# Patient Record
Sex: MALE | Race: WHITE | NOT HISPANIC OR LATINO | Employment: OTHER | URBAN - METROPOLITAN AREA
[De-identification: names, ages, dates, MRNs, and addresses within clinical notes are randomized per-mention and may not be internally consistent; named-entity substitution may affect disease eponyms.]

---

## 2024-03-07 ENCOUNTER — TELEPHONE (OUTPATIENT)
Dept: RADIATION ONCOLOGY | Facility: HOSPITAL | Age: 89
End: 2024-03-07
Payer: MEDICARE

## 2024-03-08 ENCOUNTER — CONSULT (OUTPATIENT)
Dept: RADIATION ONCOLOGY | Facility: HOSPITAL | Age: 89
End: 2024-03-08
Payer: MEDICARE

## 2024-03-08 VITALS
SYSTOLIC BLOOD PRESSURE: 128 MMHG | HEART RATE: 58 BPM | DIASTOLIC BLOOD PRESSURE: 65 MMHG | WEIGHT: 163.4 LBS | OXYGEN SATURATION: 97 %

## 2024-03-08 DIAGNOSIS — C44.92 SQUAMOUS CELL SKIN CANCER: Primary | ICD-10-CM

## 2024-03-08 DIAGNOSIS — C44.702: ICD-10-CM

## 2024-03-08 PROCEDURE — 77263 THER RADIOLOGY TX PLNG CPLX: CPT | Performed by: RADIOLOGY

## 2024-03-08 PROCEDURE — G0463 HOSPITAL OUTPT CLINIC VISIT: HCPCS

## 2024-03-08 RX ORDER — ATORVASTATIN CALCIUM 10 MG/1
20 TABLET, FILM COATED ORAL DAILY
COMMUNITY

## 2024-03-08 NOTE — PROGRESS NOTES
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    HISTORY OF PRESENT ILLNESS:  Matheus Israel is a 89 y.o. male who presents with a persistent squamous cell carcinoma of the right pretibial skin.  He is enjoyed relatively fairly good health with the exception of a cardiac bypass performed for coronary artery disease.  Patient has no soft tissue disorders.  He is also quite hard of hearing.  He does have some mild mental confusion and possible early onset dementia.    Patient has been seen by Dr. Jamar Richardson for a lesion of the right lower leg.  This area was initially biopsied 10/4/2023 and returned as a crateriform squamous cell carcinoma well-differentiated with clean margins.  Unfortunately, this area recurred quickly and shave biopsy performed 11/20/2023 revealed recurrent squamous cell carcinoma.  This area has been followed and unfortunately has developed a firm elevated mass at the previous site of excision.  This now measures some 15 x 12 mm.  Patient is referred at this time for consideration of definitive radiotherapy.        The following portions of the patient's history were reviewed and updated as appropriate: allergies, current medications, past family history, past medical history, past social history, past surgical history and problem list.  Reviewed with the patient and remain unchanged.    PAST MEDICAL HISTORY:  he  has no past medical history on file.  MEDICATIONS:   Current Outpatient Medications:     atorvastatin (LIPITOR) 10 MG tablet, Take 2 tablets by mouth Daily., Disp: , Rfl:   ALLERGIES: No Known Allergies  PAST SURGICAL HISTORY: he has no past surgical history on file.  PREVIOUS RADIOTHERAPY OR CHEMOTHERAPY: No  FAMILY HISTORY: his family history is not on file.  SOCIAL HISTORY: he    PAIN AND PAIN MANAGEMENT:   Vitals:    03/08/24 1316   BP: 128/65   Pulse: 58   SpO2: 97%   Weight: 74.1 kg (163 lb 6.4 oz)   PainSc: 0-No pain     NUTRITIONAL STATUS:  Otherwise no issues  KPS: 80:  Normal activity with effort; some signs or symptoms  ECOG: (2) Ambulatory and capable of self care, unable to carry out work activity, up and about > 50% or waking hours       PHQ-9 Total Score: 1     Review of Systems:   Review of Systems       Objective     Vitals:  Vitals:    03/08/24 1316   BP: 128/65   Pulse: 58   SpO2: 97%   Weight: 74.1 kg (163 lb 6.4 oz)   PainSc: 0-No pain         PHYSICAL EXAM:  Physical Exam     PERTINENT IMAGING/PATHOLOGY/LABS (Medical Decision Making):     COORDINATION OF CARE: A copy of this note is sent to the referring provider.    PATHOLOGY (Reviewed):     IMAGING (Reviewed):     LABS (Reviewed):  Hematology WBC   Date Value Ref Range Status   07/23/2018 7.8 3.50 - 10.00 10*3/uL Final     RBC   Date Value Ref Range Status   07/23/2018 4.84 3.5 - 5.5 10*6/uL Final     Hemoglobin   Date Value Ref Range Status   07/23/2018 14.7 13.5 - 17.5 g/dL Final     Hematocrit   Date Value Ref Range Status   07/23/2018 43.9 33.0 - 51.0 % Final     Platelets   Date Value Ref Range Status   07/23/2018 176 100 - 400 10*3/uL Final      Chemistry   Lab Results   Component Value Date    CREATININE 1.4 (H) 04/19/2018    ALBUMIN 4.5 01/31/2019    LABIL2 1.2 01/31/2019    AST 38 01/31/2019    ALT 26 01/31/2019       Assessment & Plan     ASSESSMENT  :  Diagnoses and all orders for this visit:    1. Squamous cell skin cancer (Primary)    2. Primary cancer of skin of right lower leg         PLAN: Mr. Israel appears to be a good candidate for definitive radiotherapy to the right lower leg.  Plans would be to administer a total of 51 Gray over 17 fractions with treatment delivered 4 times per week.  This would be done with low energy electrons with bolus.  Various side effects complications associated with local radiotherapy were discussed in detail with Mr. Israel as well as his son who is a physician in attendance.    I will plan to see him Monday, 11 March 2024 for initial clinical simulation.    I spent 30 minutes caring for Matheus on this date of service. This time includes time spent by me in the following activities:preparing for the visit, reviewing tests, obtaining and/or reviewing a separately obtained history, performing a medically appropriate examination and/or evaluation , ordering medications, tests, or procedures, referring and communicating with other health care professionals , and independently interpreting results and communicating that information with the patient/family/caregiver       CC: Vineet Ni,* Nikki Salazar MD Mark R. Jones, MD  3/8/2024  1:48 PM EST

## 2024-03-11 ENCOUNTER — HOSPITAL ENCOUNTER (OUTPATIENT)
Dept: RADIATION ONCOLOGY | Facility: HOSPITAL | Age: 89
Setting detail: RADIATION/ONCOLOGY SERIES
End: 2024-03-11
Payer: MEDICARE

## 2024-03-11 PROCEDURE — 77290 THER RAD SIMULAJ FIELD CPLX: CPT | Performed by: RADIOLOGY

## 2024-03-11 PROCEDURE — 77334 RADIATION TREATMENT AID(S): CPT | Performed by: RADIOLOGY

## 2024-03-14 PROCEDURE — 77334 RADIATION TREATMENT AID(S): CPT | Performed by: RADIOLOGY

## 2024-03-14 PROCEDURE — 77300 RADIATION THERAPY DOSE PLAN: CPT | Performed by: RADIOLOGY

## 2024-03-14 PROCEDURE — 77295 3-D RADIOTHERAPY PLAN: CPT | Performed by: RADIOLOGY

## 2024-03-19 ENCOUNTER — HOSPITAL ENCOUNTER (OUTPATIENT)
Dept: RADIATION ONCOLOGY | Facility: HOSPITAL | Age: 89
Discharge: HOME | End: 2024-03-19

## 2024-03-19 LAB
RAD ONC ARIA COURSE ID: NORMAL
RAD ONC ARIA COURSE INTENT: NORMAL
RAD ONC ARIA COURSE LAST TREATMENT DATE: NORMAL
RAD ONC ARIA COURSE START DATE: NORMAL
RAD ONC ARIA COURSE TREATMENT ELAPSED DAYS: 0
RAD ONC ARIA FIRST TREATMENT DATE: NORMAL
RAD ONC ARIA PLAN FRACTIONS TREATED TO DATE: 1
RAD ONC ARIA PLAN ID: NORMAL
RAD ONC ARIA PLAN PRESCRIBED DOSE PER FRACTION: 3 GY
RAD ONC ARIA PLAN PRIMARY REFERENCE POINT: NORMAL
RAD ONC ARIA PLAN TOTAL FRACTIONS PRESCRIBED: 17
RAD ONC ARIA PLAN TOTAL PRESCRIBED DOSE: 5100 CGY
RAD ONC ARIA REFERENCE POINT DOSAGE GIVEN TO DATE: 3 GY
RAD ONC ARIA REFERENCE POINT ID: NORMAL
RAD ONC ARIA REFERENCE POINT SESSION DOSAGE GIVEN: 3 GY

## 2024-03-19 PROCEDURE — 77332 RADIATION TREATMENT AID(S): CPT | Performed by: RADIOLOGY

## 2024-03-19 PROCEDURE — 77412 RADIATION TX DELIVERY LVL 3: CPT | Performed by: RADIOLOGY

## 2024-03-19 PROCEDURE — 77280 THER RAD SIMULAJ FIELD SMPL: CPT | Performed by: RADIOLOGY

## 2024-03-19 PROCEDURE — 77427 RADIATION TX MANAGEMENT X5: CPT | Performed by: RADIOLOGY

## 2024-03-20 ENCOUNTER — HOSPITAL ENCOUNTER (OUTPATIENT)
Dept: RADIATION ONCOLOGY | Facility: HOSPITAL | Age: 89
Discharge: HOME | End: 2024-03-20

## 2024-03-20 LAB
RAD ONC ARIA COURSE ID: NORMAL
RAD ONC ARIA COURSE INTENT: NORMAL
RAD ONC ARIA COURSE LAST TREATMENT DATE: NORMAL
RAD ONC ARIA COURSE START DATE: NORMAL
RAD ONC ARIA COURSE TREATMENT ELAPSED DAYS: 1
RAD ONC ARIA FIRST TREATMENT DATE: NORMAL
RAD ONC ARIA PLAN FRACTIONS TREATED TO DATE: 2
RAD ONC ARIA PLAN ID: NORMAL
RAD ONC ARIA PLAN PRESCRIBED DOSE PER FRACTION: 3 GY
RAD ONC ARIA PLAN PRIMARY REFERENCE POINT: NORMAL
RAD ONC ARIA PLAN TOTAL FRACTIONS PRESCRIBED: 17
RAD ONC ARIA PLAN TOTAL PRESCRIBED DOSE: 5100 CGY
RAD ONC ARIA REFERENCE POINT DOSAGE GIVEN TO DATE: 6 GY
RAD ONC ARIA REFERENCE POINT ID: NORMAL
RAD ONC ARIA REFERENCE POINT SESSION DOSAGE GIVEN: 3 GY

## 2024-03-20 PROCEDURE — 77412 RADIATION TX DELIVERY LVL 3: CPT | Performed by: RADIOLOGY

## 2024-03-21 ENCOUNTER — HOSPITAL ENCOUNTER (OUTPATIENT)
Dept: RADIATION ONCOLOGY | Facility: HOSPITAL | Age: 89
Discharge: HOME | End: 2024-03-21

## 2024-03-21 LAB
RAD ONC ARIA COURSE ID: NORMAL
RAD ONC ARIA COURSE INTENT: NORMAL
RAD ONC ARIA COURSE LAST TREATMENT DATE: NORMAL
RAD ONC ARIA COURSE START DATE: NORMAL
RAD ONC ARIA COURSE TREATMENT ELAPSED DAYS: 2
RAD ONC ARIA FIRST TREATMENT DATE: NORMAL
RAD ONC ARIA PLAN FRACTIONS TREATED TO DATE: 3
RAD ONC ARIA PLAN ID: NORMAL
RAD ONC ARIA PLAN PRESCRIBED DOSE PER FRACTION: 3 GY
RAD ONC ARIA PLAN PRIMARY REFERENCE POINT: NORMAL
RAD ONC ARIA PLAN TOTAL FRACTIONS PRESCRIBED: 17
RAD ONC ARIA PLAN TOTAL PRESCRIBED DOSE: 5100 CGY
RAD ONC ARIA REFERENCE POINT DOSAGE GIVEN TO DATE: 9 GY
RAD ONC ARIA REFERENCE POINT ID: NORMAL
RAD ONC ARIA REFERENCE POINT SESSION DOSAGE GIVEN: 3 GY

## 2024-03-21 PROCEDURE — 77412 RADIATION TX DELIVERY LVL 3: CPT | Performed by: RADIOLOGY

## 2024-03-21 PROCEDURE — 77336 RADIATION PHYSICS CONSULT: CPT | Performed by: RADIOLOGY

## 2024-03-22 ENCOUNTER — HOSPITAL ENCOUNTER (OUTPATIENT)
Dept: RADIATION ONCOLOGY | Facility: HOSPITAL | Age: 89
Discharge: HOME | End: 2024-03-22

## 2024-03-22 LAB
RAD ONC ARIA COURSE ID: NORMAL
RAD ONC ARIA COURSE INTENT: NORMAL
RAD ONC ARIA COURSE LAST TREATMENT DATE: NORMAL
RAD ONC ARIA COURSE START DATE: NORMAL
RAD ONC ARIA COURSE TREATMENT ELAPSED DAYS: 3
RAD ONC ARIA FIRST TREATMENT DATE: NORMAL
RAD ONC ARIA PLAN FRACTIONS TREATED TO DATE: 4
RAD ONC ARIA PLAN ID: NORMAL
RAD ONC ARIA PLAN PRESCRIBED DOSE PER FRACTION: 3 GY
RAD ONC ARIA PLAN PRIMARY REFERENCE POINT: NORMAL
RAD ONC ARIA PLAN TOTAL FRACTIONS PRESCRIBED: 17
RAD ONC ARIA PLAN TOTAL PRESCRIBED DOSE: 5100 CGY
RAD ONC ARIA REFERENCE POINT DOSAGE GIVEN TO DATE: 12 GY
RAD ONC ARIA REFERENCE POINT ID: NORMAL
RAD ONC ARIA REFERENCE POINT SESSION DOSAGE GIVEN: 3 GY

## 2024-03-22 PROCEDURE — 77412 RADIATION TX DELIVERY LVL 3: CPT | Performed by: RADIOLOGY

## 2024-03-25 ENCOUNTER — HOSPITAL ENCOUNTER (OUTPATIENT)
Dept: RADIATION ONCOLOGY | Facility: HOSPITAL | Age: 89
Discharge: HOME | End: 2024-03-25
Payer: MEDICARE

## 2024-03-25 ENCOUNTER — RADIATION ONCOLOGY WEEKLY ASSESSMENT (OUTPATIENT)
Dept: RADIATION ONCOLOGY | Facility: HOSPITAL | Age: 89
End: 2024-03-25
Payer: MEDICARE

## 2024-03-25 VITALS — DIASTOLIC BLOOD PRESSURE: 80 MMHG | SYSTOLIC BLOOD PRESSURE: 146 MMHG | OXYGEN SATURATION: 96 % | HEART RATE: 57 BPM

## 2024-03-25 DIAGNOSIS — C44.702: ICD-10-CM

## 2024-03-25 DIAGNOSIS — C44.92 SQUAMOUS CELL SKIN CANCER: Primary | ICD-10-CM

## 2024-03-25 LAB
RAD ONC ARIA COURSE ID: NORMAL
RAD ONC ARIA COURSE INTENT: NORMAL
RAD ONC ARIA COURSE LAST TREATMENT DATE: NORMAL
RAD ONC ARIA COURSE START DATE: NORMAL
RAD ONC ARIA COURSE TREATMENT ELAPSED DAYS: 6
RAD ONC ARIA FIRST TREATMENT DATE: NORMAL
RAD ONC ARIA PLAN FRACTIONS TREATED TO DATE: 5
RAD ONC ARIA PLAN ID: NORMAL
RAD ONC ARIA PLAN PRESCRIBED DOSE PER FRACTION: 3 GY
RAD ONC ARIA PLAN PRIMARY REFERENCE POINT: NORMAL
RAD ONC ARIA PLAN TOTAL FRACTIONS PRESCRIBED: 17
RAD ONC ARIA PLAN TOTAL PRESCRIBED DOSE: 5100 CGY
RAD ONC ARIA REFERENCE POINT DOSAGE GIVEN TO DATE: 15 GY
RAD ONC ARIA REFERENCE POINT ID: NORMAL
RAD ONC ARIA REFERENCE POINT SESSION DOSAGE GIVEN: 3 GY

## 2024-03-25 PROCEDURE — 77412 RADIATION TX DELIVERY LVL 3: CPT | Performed by: RADIOLOGY

## 2024-03-25 NOTE — PROGRESS NOTES
Radiation Oncology  On-Treatment Note      Patient: Matheus Israel    MRN: [id number]    Attending Physician: Odell Szymanski MD     Diagnosis:     ICD-10-CM ICD-9-CM   1. Squamous cell skin cancer  C44.92 173.92   2. Primary cancer of skin of right lower leg  C44.702 173.70       Radiation Therapy Visit:  Continue radiation therapy, Dosimetry plan remains acceptable, Films reviewed and remains acceptable, Pain assessed, Pain management planned, Radiation dose schedule reviewed and remains acceptable, Radiation technique remains acceptable, and Symptoms within expected range    Radiation Treatments       Active   Plans   Rt Lower Leg   Most recent treatment: Dose planned: 300 cGy (fraction 5 on 3/25/2024)   Total: Dose planned: 5,100 cGy (17 fractions)   Elapsed Days: 6      Reference Points   RX: RtLowerLeg   Most recent treatment: Dose given: 300 cGy (on 3/25/2024)   Total: Dose given: 1,500 cGy   Elapsed Days: 6                      Physical Examination:  Vitals: Blood pressure 146/80, pulse 57, SpO2 96%.  Pain Score    03/25/24 1220   PainSc: 0-No pain       Restricted in physically strenuous activity but ambulatory and able to carry out work of a light or sedentary nature, e.g., light house work, office work = 1    We examined the relevant areas: yes, minimal reaction so far. No breakdown seen.  Findings are within the expected range for this stage of treatment: yes  -------------------------------------------------------------------------------------------------------------------    ACTION ITEMS:  Patient tolerating treatment well and as expected for this stage in their treatment and Continue radiation therapy as planned    Estimated Completion Date: 3 weeks      Odell Szymanski MD  Radiation Oncology

## 2024-03-26 ENCOUNTER — HOSPITAL ENCOUNTER (OUTPATIENT)
Dept: RADIATION ONCOLOGY | Facility: HOSPITAL | Age: 89
Discharge: HOME | End: 2024-03-26

## 2024-03-26 LAB
RAD ONC ARIA COURSE ID: NORMAL
RAD ONC ARIA COURSE INTENT: NORMAL
RAD ONC ARIA COURSE LAST TREATMENT DATE: NORMAL
RAD ONC ARIA COURSE START DATE: NORMAL
RAD ONC ARIA COURSE TREATMENT ELAPSED DAYS: 7
RAD ONC ARIA FIRST TREATMENT DATE: NORMAL
RAD ONC ARIA PLAN FRACTIONS TREATED TO DATE: 6
RAD ONC ARIA PLAN ID: NORMAL
RAD ONC ARIA PLAN PRESCRIBED DOSE PER FRACTION: 3 GY
RAD ONC ARIA PLAN PRIMARY REFERENCE POINT: NORMAL
RAD ONC ARIA PLAN TOTAL FRACTIONS PRESCRIBED: 17
RAD ONC ARIA PLAN TOTAL PRESCRIBED DOSE: 5100 CGY
RAD ONC ARIA REFERENCE POINT DOSAGE GIVEN TO DATE: 18 GY
RAD ONC ARIA REFERENCE POINT ID: NORMAL
RAD ONC ARIA REFERENCE POINT SESSION DOSAGE GIVEN: 3 GY

## 2024-03-26 PROCEDURE — 77427 RADIATION TX MANAGEMENT X5: CPT | Performed by: RADIOLOGY

## 2024-03-26 PROCEDURE — 77412 RADIATION TX DELIVERY LVL 3: CPT | Performed by: RADIOLOGY

## 2024-03-27 ENCOUNTER — HOSPITAL ENCOUNTER (OUTPATIENT)
Dept: RADIATION ONCOLOGY | Facility: HOSPITAL | Age: 89
Discharge: HOME | End: 2024-03-27

## 2024-03-27 LAB
RAD ONC ARIA COURSE ID: NORMAL
RAD ONC ARIA COURSE INTENT: NORMAL
RAD ONC ARIA COURSE LAST TREATMENT DATE: NORMAL
RAD ONC ARIA COURSE START DATE: NORMAL
RAD ONC ARIA COURSE TREATMENT ELAPSED DAYS: 8
RAD ONC ARIA FIRST TREATMENT DATE: NORMAL
RAD ONC ARIA PLAN FRACTIONS TREATED TO DATE: 7
RAD ONC ARIA PLAN ID: NORMAL
RAD ONC ARIA PLAN PRESCRIBED DOSE PER FRACTION: 3 GY
RAD ONC ARIA PLAN PRIMARY REFERENCE POINT: NORMAL
RAD ONC ARIA PLAN TOTAL FRACTIONS PRESCRIBED: 17
RAD ONC ARIA PLAN TOTAL PRESCRIBED DOSE: 5100 CGY
RAD ONC ARIA REFERENCE POINT DOSAGE GIVEN TO DATE: 21 GY
RAD ONC ARIA REFERENCE POINT ID: NORMAL
RAD ONC ARIA REFERENCE POINT SESSION DOSAGE GIVEN: 3 GY

## 2024-03-27 PROCEDURE — 77412 RADIATION TX DELIVERY LVL 3: CPT | Performed by: RADIOLOGY

## 2024-03-28 ENCOUNTER — HOSPITAL ENCOUNTER (OUTPATIENT)
Dept: RADIATION ONCOLOGY | Facility: HOSPITAL | Age: 89
Discharge: HOME | End: 2024-03-28

## 2024-03-28 LAB
RAD ONC ARIA COURSE ID: NORMAL
RAD ONC ARIA COURSE INTENT: NORMAL
RAD ONC ARIA COURSE LAST TREATMENT DATE: NORMAL
RAD ONC ARIA COURSE START DATE: NORMAL
RAD ONC ARIA COURSE TREATMENT ELAPSED DAYS: 9
RAD ONC ARIA FIRST TREATMENT DATE: NORMAL
RAD ONC ARIA PLAN FRACTIONS TREATED TO DATE: 8
RAD ONC ARIA PLAN ID: NORMAL
RAD ONC ARIA PLAN PRESCRIBED DOSE PER FRACTION: 3 GY
RAD ONC ARIA PLAN PRIMARY REFERENCE POINT: NORMAL
RAD ONC ARIA PLAN TOTAL FRACTIONS PRESCRIBED: 17
RAD ONC ARIA PLAN TOTAL PRESCRIBED DOSE: 5100 CGY
RAD ONC ARIA REFERENCE POINT DOSAGE GIVEN TO DATE: 24 GY
RAD ONC ARIA REFERENCE POINT ID: NORMAL
RAD ONC ARIA REFERENCE POINT SESSION DOSAGE GIVEN: 3 GY

## 2024-03-28 PROCEDURE — 77412 RADIATION TX DELIVERY LVL 3: CPT | Performed by: RADIOLOGY

## 2024-03-28 PROCEDURE — 77336 RADIATION PHYSICS CONSULT: CPT | Performed by: RADIOLOGY

## 2024-04-01 ENCOUNTER — HOSPITAL ENCOUNTER (OUTPATIENT)
Dept: RADIATION ONCOLOGY | Facility: HOSPITAL | Age: 89
Discharge: HOME | End: 2024-04-01
Payer: MEDICARE

## 2024-04-01 ENCOUNTER — HOSPITAL ENCOUNTER (OUTPATIENT)
Dept: RADIATION ONCOLOGY | Facility: HOSPITAL | Age: 89
Setting detail: RADIATION/ONCOLOGY SERIES
End: 2024-04-01
Payer: MEDICARE

## 2024-04-01 LAB
RAD ONC ARIA COURSE ID: NORMAL
RAD ONC ARIA COURSE INTENT: NORMAL
RAD ONC ARIA COURSE LAST TREATMENT DATE: NORMAL
RAD ONC ARIA COURSE START DATE: NORMAL
RAD ONC ARIA COURSE TREATMENT ELAPSED DAYS: 13
RAD ONC ARIA FIRST TREATMENT DATE: NORMAL
RAD ONC ARIA PLAN FRACTIONS TREATED TO DATE: 9
RAD ONC ARIA PLAN ID: NORMAL
RAD ONC ARIA PLAN PRESCRIBED DOSE PER FRACTION: 3 GY
RAD ONC ARIA PLAN PRIMARY REFERENCE POINT: NORMAL
RAD ONC ARIA PLAN TOTAL FRACTIONS PRESCRIBED: 17
RAD ONC ARIA PLAN TOTAL PRESCRIBED DOSE: 5100 CGY
RAD ONC ARIA REFERENCE POINT DOSAGE GIVEN TO DATE: 27 GY
RAD ONC ARIA REFERENCE POINT ID: NORMAL
RAD ONC ARIA REFERENCE POINT SESSION DOSAGE GIVEN: 3 GY

## 2024-04-02 ENCOUNTER — HOSPITAL ENCOUNTER (OUTPATIENT)
Dept: RADIATION ONCOLOGY | Facility: HOSPITAL | Age: 89
Discharge: HOME | End: 2024-04-02

## 2024-04-02 LAB
RAD ONC ARIA COURSE ID: NORMAL
RAD ONC ARIA COURSE INTENT: NORMAL
RAD ONC ARIA COURSE LAST TREATMENT DATE: NORMAL
RAD ONC ARIA COURSE START DATE: NORMAL
RAD ONC ARIA COURSE TREATMENT ELAPSED DAYS: 14
RAD ONC ARIA FIRST TREATMENT DATE: NORMAL
RAD ONC ARIA PLAN FRACTIONS TREATED TO DATE: 10
RAD ONC ARIA PLAN ID: NORMAL
RAD ONC ARIA PLAN PRESCRIBED DOSE PER FRACTION: 3 GY
RAD ONC ARIA PLAN PRIMARY REFERENCE POINT: NORMAL
RAD ONC ARIA PLAN TOTAL FRACTIONS PRESCRIBED: 17
RAD ONC ARIA PLAN TOTAL PRESCRIBED DOSE: 5100 CGY
RAD ONC ARIA REFERENCE POINT DOSAGE GIVEN TO DATE: 30 GY
RAD ONC ARIA REFERENCE POINT ID: NORMAL
RAD ONC ARIA REFERENCE POINT SESSION DOSAGE GIVEN: 3 GY

## 2024-04-02 PROCEDURE — 77412 RADIATION TX DELIVERY LVL 3: CPT | Performed by: RADIOLOGY

## 2024-04-03 ENCOUNTER — HOSPITAL ENCOUNTER (OUTPATIENT)
Dept: RADIATION ONCOLOGY | Facility: HOSPITAL | Age: 89
Discharge: HOME | End: 2024-04-03

## 2024-04-03 LAB
RAD ONC ARIA COURSE ID: NORMAL
RAD ONC ARIA COURSE INTENT: NORMAL
RAD ONC ARIA COURSE LAST TREATMENT DATE: NORMAL
RAD ONC ARIA COURSE START DATE: NORMAL
RAD ONC ARIA COURSE TREATMENT ELAPSED DAYS: 15
RAD ONC ARIA FIRST TREATMENT DATE: NORMAL
RAD ONC ARIA PLAN FRACTIONS TREATED TO DATE: 11
RAD ONC ARIA PLAN ID: NORMAL
RAD ONC ARIA PLAN PRESCRIBED DOSE PER FRACTION: 3 GY
RAD ONC ARIA PLAN PRIMARY REFERENCE POINT: NORMAL
RAD ONC ARIA PLAN TOTAL FRACTIONS PRESCRIBED: 17
RAD ONC ARIA PLAN TOTAL PRESCRIBED DOSE: 5100 CGY
RAD ONC ARIA REFERENCE POINT DOSAGE GIVEN TO DATE: 33 GY
RAD ONC ARIA REFERENCE POINT ID: NORMAL
RAD ONC ARIA REFERENCE POINT SESSION DOSAGE GIVEN: 3 GY

## 2024-04-03 PROCEDURE — 77427 RADIATION TX MANAGEMENT X5: CPT | Performed by: RADIOLOGY

## 2024-04-03 PROCEDURE — 77412 RADIATION TX DELIVERY LVL 3: CPT | Performed by: RADIOLOGY

## 2024-04-04 ENCOUNTER — RADIATION ONCOLOGY WEEKLY ASSESSMENT (OUTPATIENT)
Dept: RADIATION ONCOLOGY | Facility: HOSPITAL | Age: 89
End: 2024-04-04
Payer: MEDICARE

## 2024-04-04 ENCOUNTER — HOSPITAL ENCOUNTER (OUTPATIENT)
Dept: RADIATION ONCOLOGY | Facility: HOSPITAL | Age: 89
Discharge: HOME | End: 2024-04-04

## 2024-04-04 VITALS — DIASTOLIC BLOOD PRESSURE: 73 MMHG | OXYGEN SATURATION: 96 % | SYSTOLIC BLOOD PRESSURE: 129 MMHG | HEART RATE: 61 BPM

## 2024-04-04 DIAGNOSIS — C44.702: ICD-10-CM

## 2024-04-04 DIAGNOSIS — C44.92 SQUAMOUS CELL SKIN CANCER: Primary | ICD-10-CM

## 2024-04-04 LAB
RAD ONC ARIA COURSE ID: NORMAL
RAD ONC ARIA COURSE INTENT: NORMAL
RAD ONC ARIA COURSE LAST TREATMENT DATE: NORMAL
RAD ONC ARIA COURSE START DATE: NORMAL
RAD ONC ARIA COURSE TREATMENT ELAPSED DAYS: 16
RAD ONC ARIA FIRST TREATMENT DATE: NORMAL
RAD ONC ARIA PLAN FRACTIONS TREATED TO DATE: 12
RAD ONC ARIA PLAN ID: NORMAL
RAD ONC ARIA PLAN PRESCRIBED DOSE PER FRACTION: 3 GY
RAD ONC ARIA PLAN PRIMARY REFERENCE POINT: NORMAL
RAD ONC ARIA PLAN TOTAL FRACTIONS PRESCRIBED: 17
RAD ONC ARIA PLAN TOTAL PRESCRIBED DOSE: 5100 CGY
RAD ONC ARIA REFERENCE POINT DOSAGE GIVEN TO DATE: 36 GY
RAD ONC ARIA REFERENCE POINT ID: NORMAL
RAD ONC ARIA REFERENCE POINT SESSION DOSAGE GIVEN: 3 GY

## 2024-04-04 PROCEDURE — 77412 RADIATION TX DELIVERY LVL 3: CPT | Performed by: RADIOLOGY

## 2024-04-04 NOTE — PROGRESS NOTES
Radiation Oncology  On-Treatment Note      Patient: Matheus Israel    MRN: [id number]    Attending Physician: Odell Szymanski MD     Diagnosis:     ICD-10-CM ICD-9-CM   1. Squamous cell skin cancer  C44.92 173.92   2. Primary cancer of skin of right lower leg  C44.702 173.70       Radiation Therapy Visit:  Continue radiation therapy, Dosimetry plan remains acceptable, Films reviewed and remains acceptable, Pain assessed, Pain management planned, Radiation dose schedule reviewed and remains acceptable, Radiation technique remains acceptable, and Symptoms within expected range    Radiation Treatments       Active   Plans   Rt Lower Leg   Most recent treatment: Dose planned: 300 cGy (fraction 12 on 4/4/2024)   Total: Dose planned: 5,100 cGy (17 fractions)   Elapsed Days: 16      Reference Points   RX: RtLowerLeg   Most recent treatment: Dose given: 300 cGy (on 4/4/2024)   Total: Dose given: 3,600 cGy   Elapsed Days: 16                      Physical Examination:  Vitals: Blood pressure 129/73, pulse 61, SpO2 96%.  Pain Score    04/04/24 1147   PainSc: 0-No pain       Restricted in physically strenuous activity but ambulatory and able to carry out work of a light or sedentary nature, e.g., light house work, office work = 1    We examined the relevant areas: yes, the skin of the right lower leg is examined today.  There is minimal erythema.  The lesion itself is smaller as well as softer.  There is less bleeding on examination.  Patient has no lower leg edema whatsoever  Findings are within the expected range for this stage of treatment: yes  -------------------------------------------------------------------------------------------------------------------    ACTION ITEMS:  Patient tolerating treatment well and as expected for this stage in their treatment and Continue radiation therapy as planned    Estimated Completion Date: 2 weeks      Odell Szymanski MD  Radiation Oncology

## 2024-04-08 ENCOUNTER — RADIATION ONCOLOGY WEEKLY ASSESSMENT (OUTPATIENT)
Dept: RADIATION ONCOLOGY | Facility: HOSPITAL | Age: 89
End: 2024-04-08
Payer: MEDICARE

## 2024-04-08 ENCOUNTER — HOSPITAL ENCOUNTER (OUTPATIENT)
Dept: RADIATION ONCOLOGY | Facility: HOSPITAL | Age: 89
Discharge: HOME | End: 2024-04-08
Payer: MEDICARE

## 2024-04-08 VITALS
SYSTOLIC BLOOD PRESSURE: 139 MMHG | HEART RATE: 65 BPM | WEIGHT: 163.2 LBS | DIASTOLIC BLOOD PRESSURE: 55 MMHG | OXYGEN SATURATION: 95 %

## 2024-04-08 DIAGNOSIS — C44.92 SQUAMOUS CELL SKIN CANCER: Primary | ICD-10-CM

## 2024-04-08 DIAGNOSIS — C44.702: ICD-10-CM

## 2024-04-08 LAB
RAD ONC ARIA COURSE ID: NORMAL
RAD ONC ARIA COURSE INTENT: NORMAL
RAD ONC ARIA COURSE LAST TREATMENT DATE: NORMAL
RAD ONC ARIA COURSE START DATE: NORMAL
RAD ONC ARIA COURSE TREATMENT ELAPSED DAYS: 20
RAD ONC ARIA FIRST TREATMENT DATE: NORMAL
RAD ONC ARIA PLAN FRACTIONS TREATED TO DATE: 13
RAD ONC ARIA PLAN ID: NORMAL
RAD ONC ARIA PLAN PRESCRIBED DOSE PER FRACTION: 3 GY
RAD ONC ARIA PLAN PRIMARY REFERENCE POINT: NORMAL
RAD ONC ARIA PLAN TOTAL FRACTIONS PRESCRIBED: 17
RAD ONC ARIA PLAN TOTAL PRESCRIBED DOSE: 5100 CGY
RAD ONC ARIA REFERENCE POINT DOSAGE GIVEN TO DATE: 39 GY
RAD ONC ARIA REFERENCE POINT ID: NORMAL
RAD ONC ARIA REFERENCE POINT SESSION DOSAGE GIVEN: 3 GY

## 2024-04-08 PROCEDURE — 77412 RADIATION TX DELIVERY LVL 3: CPT | Performed by: RADIOLOGY

## 2024-04-08 PROCEDURE — 77336 RADIATION PHYSICS CONSULT: CPT | Performed by: RADIOLOGY

## 2024-04-08 NOTE — PROGRESS NOTES
Radiation Oncology  On-Treatment Note      Patient: Matheus Israel    MRN: [id number]    Attending Physician: Odell Szymanski MD     Diagnosis:     ICD-10-CM ICD-9-CM   1. Squamous cell skin cancer  C44.92 173.92   2. Primary cancer of skin of right lower leg  C44.702 173.70       Radiation Therapy Visit:  Continue radiation therapy, Dosimetry plan remains acceptable, Films reviewed and remains acceptable, Pain assessed, Pain management planned, Radiation dose schedule reviewed and remains acceptable, Radiation technique remains acceptable, and Symptoms within expected range    Radiation Treatments       Active   Plans   Rt Lower Leg   Most recent treatment: Dose planned: 300 cGy (fraction 13 on 4/8/2024)   Total: Dose planned: 5,100 cGy (17 fractions)   Elapsed Days: 20      Reference Points   RX: RtLowerLeg   Most recent treatment: Dose given: 300 cGy (on 4/8/2024)   Total: Dose given: 3,900 cGy   Elapsed Days: 20                      Physical Examination:  Vitals: Blood pressure 139/55, pulse 65, weight 74 kg (163 lb 3.2 oz), SpO2 95%.  Pain Score    04/08/24 1156   PainSc: 0-No pain       Restricted in physically strenuous activity but ambulatory and able to carry out work of a light or sedentary nature, e.g., light house work, office work = 1    We examined the relevant areas: yes,   Findings are within the expected range for this stage of treatment: yes, Mr. Israel continues to do exceptionally well.  There is an ulcerative area within the primary lesion which will likely become larger.  Overall, the squamous cell is much flatter than on initial presentation.  Reaction is appropriate for dose.  Patient continues to use Aquaphor ointment on a daily basis.  I have given him nonstick pads to use on this area when it becomes a bit more necrotic and starts to drain.  -------------------------------------------------------------------------------------------------------------------    ACTION ITEMS:  Patient tolerating  treatment well and as expected for this stage in their treatment and Continue radiation therapy as planned    Estimated Completion Date: 5 days      Odell Szymanski MD  Radiation Oncology

## 2024-04-09 ENCOUNTER — HOSPITAL ENCOUNTER (OUTPATIENT)
Dept: RADIATION ONCOLOGY | Facility: HOSPITAL | Age: 89
Discharge: HOME | End: 2024-04-09

## 2024-04-09 LAB
RAD ONC ARIA COURSE ID: NORMAL
RAD ONC ARIA COURSE INTENT: NORMAL
RAD ONC ARIA COURSE LAST TREATMENT DATE: NORMAL
RAD ONC ARIA COURSE START DATE: NORMAL
RAD ONC ARIA COURSE TREATMENT ELAPSED DAYS: 21
RAD ONC ARIA FIRST TREATMENT DATE: NORMAL
RAD ONC ARIA PLAN FRACTIONS TREATED TO DATE: 14
RAD ONC ARIA PLAN ID: NORMAL
RAD ONC ARIA PLAN PRESCRIBED DOSE PER FRACTION: 3 GY
RAD ONC ARIA PLAN PRIMARY REFERENCE POINT: NORMAL
RAD ONC ARIA PLAN TOTAL FRACTIONS PRESCRIBED: 17
RAD ONC ARIA PLAN TOTAL PRESCRIBED DOSE: 5100 CGY
RAD ONC ARIA REFERENCE POINT DOSAGE GIVEN TO DATE: 42 GY
RAD ONC ARIA REFERENCE POINT ID: NORMAL
RAD ONC ARIA REFERENCE POINT SESSION DOSAGE GIVEN: 3 GY

## 2024-04-09 PROCEDURE — 77412 RADIATION TX DELIVERY LVL 3: CPT | Performed by: RADIOLOGY

## 2024-04-10 ENCOUNTER — HOSPITAL ENCOUNTER (OUTPATIENT)
Dept: RADIATION ONCOLOGY | Facility: HOSPITAL | Age: 89
Discharge: HOME | End: 2024-04-10

## 2024-04-10 LAB
RAD ONC ARIA COURSE ID: NORMAL
RAD ONC ARIA COURSE INTENT: NORMAL
RAD ONC ARIA COURSE LAST TREATMENT DATE: NORMAL
RAD ONC ARIA COURSE START DATE: NORMAL
RAD ONC ARIA COURSE TREATMENT ELAPSED DAYS: 22
RAD ONC ARIA FIRST TREATMENT DATE: NORMAL
RAD ONC ARIA PLAN FRACTIONS TREATED TO DATE: 15
RAD ONC ARIA PLAN ID: NORMAL
RAD ONC ARIA PLAN PRESCRIBED DOSE PER FRACTION: 3 GY
RAD ONC ARIA PLAN PRIMARY REFERENCE POINT: NORMAL
RAD ONC ARIA PLAN TOTAL FRACTIONS PRESCRIBED: 17
RAD ONC ARIA PLAN TOTAL PRESCRIBED DOSE: 5100 CGY
RAD ONC ARIA REFERENCE POINT DOSAGE GIVEN TO DATE: 45 GY
RAD ONC ARIA REFERENCE POINT ID: NORMAL
RAD ONC ARIA REFERENCE POINT SESSION DOSAGE GIVEN: 3 GY

## 2024-04-10 PROCEDURE — 77412 RADIATION TX DELIVERY LVL 3: CPT | Performed by: RADIOLOGY

## 2024-04-11 ENCOUNTER — HOSPITAL ENCOUNTER (OUTPATIENT)
Dept: RADIATION ONCOLOGY | Facility: HOSPITAL | Age: 89
Discharge: HOME | End: 2024-04-11

## 2024-04-11 LAB
RAD ONC ARIA COURSE ID: NORMAL
RAD ONC ARIA COURSE INTENT: NORMAL
RAD ONC ARIA COURSE LAST TREATMENT DATE: NORMAL
RAD ONC ARIA COURSE START DATE: NORMAL
RAD ONC ARIA COURSE TREATMENT ELAPSED DAYS: 23
RAD ONC ARIA FIRST TREATMENT DATE: NORMAL
RAD ONC ARIA PLAN FRACTIONS TREATED TO DATE: 16
RAD ONC ARIA PLAN ID: NORMAL
RAD ONC ARIA PLAN PRESCRIBED DOSE PER FRACTION: 3 GY
RAD ONC ARIA PLAN PRIMARY REFERENCE POINT: NORMAL
RAD ONC ARIA PLAN TOTAL FRACTIONS PRESCRIBED: 17
RAD ONC ARIA PLAN TOTAL PRESCRIBED DOSE: 5100 CGY
RAD ONC ARIA REFERENCE POINT DOSAGE GIVEN TO DATE: 48 GY
RAD ONC ARIA REFERENCE POINT ID: NORMAL
RAD ONC ARIA REFERENCE POINT SESSION DOSAGE GIVEN: 3 GY

## 2024-04-11 PROCEDURE — 77412 RADIATION TX DELIVERY LVL 3: CPT | Performed by: RADIOLOGY

## 2024-04-12 ENCOUNTER — HOSPITAL ENCOUNTER (OUTPATIENT)
Dept: RADIATION ONCOLOGY | Facility: HOSPITAL | Age: 89
Discharge: HOME | End: 2024-04-12

## 2024-04-12 ENCOUNTER — TREATMENT (OUTPATIENT)
Dept: RADIATION ONCOLOGY | Facility: HOSPITAL | Age: 89
End: 2024-04-12
Payer: MEDICARE

## 2024-04-12 DIAGNOSIS — C44.702: ICD-10-CM

## 2024-04-12 DIAGNOSIS — C44.92 SQUAMOUS CELL SKIN CANCER: Primary | ICD-10-CM

## 2024-04-12 LAB
RAD ONC ARIA COURSE ID: NORMAL
RAD ONC ARIA COURSE INTENT: NORMAL
RAD ONC ARIA COURSE LAST TREATMENT DATE: NORMAL
RAD ONC ARIA COURSE START DATE: NORMAL
RAD ONC ARIA COURSE TREATMENT ELAPSED DAYS: 24
RAD ONC ARIA FIRST TREATMENT DATE: NORMAL
RAD ONC ARIA PLAN FRACTIONS TREATED TO DATE: 17
RAD ONC ARIA PLAN ID: NORMAL
RAD ONC ARIA PLAN PRESCRIBED DOSE PER FRACTION: 3 GY
RAD ONC ARIA PLAN PRIMARY REFERENCE POINT: NORMAL
RAD ONC ARIA PLAN TOTAL FRACTIONS PRESCRIBED: 17
RAD ONC ARIA PLAN TOTAL PRESCRIBED DOSE: 5100 CGY
RAD ONC ARIA REFERENCE POINT DOSAGE GIVEN TO DATE: 51 GY
RAD ONC ARIA REFERENCE POINT ID: NORMAL
RAD ONC ARIA REFERENCE POINT SESSION DOSAGE GIVEN: 3 GY

## 2024-04-12 PROCEDURE — 77412 RADIATION TX DELIVERY LVL 3: CPT | Performed by: RADIOLOGY

## 2024-04-12 NOTE — PROGRESS NOTES
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 Squamous cell skin cancer  C44.92 173.92   2. Primary cancer of skin of right lower leg  C44.702 173.70     Mr. Israel is a 89-year-old gentleman who has enjoyed fairly good health with exception of previous MI and possible early onset dementia.  He has a history of extensive sun exposure and developed a crusty lesion of his right lower leg.  He was seen by Dr. Jamar Richardson who performed biopsy 10/4/2023 which was returned as a squamous cell carcinoma.  This is a very difficult area on which to operate due to very low blood supply.  As result, patient was referred for definitive radiotherapy.  Patient's received definitive treatment as described below:    Radiation Start Date: 3/19/2024    Radiation Completion Date: 2024    Prescription:     Site: Pretibial skin  Laterality: Right  Total Dose: 5100 cGy  Dose per Fraction: 300 cGy  Total Fractions: 17  Daily or BID: Daily  Modality: Electron, 9mev  Technique: 2D  Bolus: Yes, describe: 1 cm bolus daily    Final Delivered Dose Deviated From Initially Prescribed Dose: No    Concurrent Chemotherapy: No    Patient Tolerated Treatment Without Unexpected Side Effects/Complications: Yes    ECOG: Restricted in physically strenuous activity but ambulatory and able to carry out work of a light or sedentary nature, e.g., light house work, office work = 1    Pain Management Plan: None Indicated/PRN OTC    Follow-Up Plan: 4-6 weeks    Imaging Ordered for Follow-Up: None/NA        Odell Szymanski MD

## 2024-04-16 ENCOUNTER — TREATMENT (OUTPATIENT)
Dept: RADIATION ONCOLOGY | Facility: HOSPITAL | Age: 89
End: 2024-04-16
Payer: MEDICARE

## 2024-04-16 PROCEDURE — 99024 POSTOP FOLLOW-UP VISIT: CPT | Performed by: RADIOLOGY

## 2024-04-16 NOTE — PROGRESS NOTES
[facility] Radiation Oncology   Follow Up    Chief Complaint  Skin irritation of the leg      Diagnosis: Stage II, T2 N0 M0 squamous cell carcinoma of the right lower leg        Interval History:    Matheus Israel called today with a bit of concern regarding the appearance of the right lower leg.  He sent this image to review.      Imaging:            Pathology:      Squamous cell carcinoma the skin      Labs:    Lab Results   Component Value Date    CREATININE 1.4 (H) 04/19/2018                 Problem List:  Patient Active Problem List   Diagnosis    Squamous cell skin cancer          Medications:  Current Outpatient Medications on File Prior to Visit   Medication Sig Dispense Refill    atorvastatin (LIPITOR) 10 MG tablet Take 2 tablets by mouth Daily.       No current facility-administered medications on file prior to visit.          Allergies:  No Known Allergies                  Plan:    Despite this concern, this is a rather normal appearance for 4 days post completion of treatment.  We will continue to utilize Silvadene cream on the central necrotic area until it has closed completely.  Patient is reassured.  I will see him in approximately 10 days for reevaluation.         Follow Up   10 days  Patient was given instructions and counseling regarding his condition or for health maintenance advice. Please see specific information pulled into the AVS if appropriate.     Odell Szymanski MD

## 2024-04-17 DIAGNOSIS — C44.702: Primary | ICD-10-CM

## 2024-04-18 LAB
RAD ONC ARIA COURSE END DATE: NORMAL
RAD ONC ARIA COURSE ID: NORMAL
RAD ONC ARIA COURSE INTENT: NORMAL
RAD ONC ARIA COURSE LAST TREATMENT DATE: NORMAL
RAD ONC ARIA COURSE START DATE: NORMAL
RAD ONC ARIA COURSE TREATMENT ELAPSED DAYS: 24
RAD ONC ARIA FIRST TREATMENT DATE: NORMAL
RAD ONC ARIA PLAN FRACTIONS TREATED TO DATE: 17
RAD ONC ARIA PLAN ID: NORMAL
RAD ONC ARIA PLAN NAME: NORMAL
RAD ONC ARIA PLAN PRESCRIBED DOSE PER FRACTION: 3 GY
RAD ONC ARIA PLAN PRIMARY REFERENCE POINT: NORMAL
RAD ONC ARIA PLAN TOTAL FRACTIONS PRESCRIBED: 17
RAD ONC ARIA PLAN TOTAL PRESCRIBED DOSE: 5100 CGY
RAD ONC ARIA REFERENCE POINT DOSAGE GIVEN TO DATE: 51 GY
RAD ONC ARIA REFERENCE POINT ID: NORMAL

## 2024-04-25 ENCOUNTER — TELEPHONE (OUTPATIENT)
Dept: RADIATION ONCOLOGY | Facility: HOSPITAL | Age: 89
End: 2024-04-25
Payer: MEDICARE

## 2024-04-26 ENCOUNTER — OFFICE VISIT (OUTPATIENT)
Dept: RADIATION ONCOLOGY | Facility: HOSPITAL | Age: 89
End: 2024-04-26
Payer: MEDICARE

## 2024-04-26 VITALS
HEART RATE: 74 BPM | OXYGEN SATURATION: 97 % | SYSTOLIC BLOOD PRESSURE: 109 MMHG | WEIGHT: 162.4 LBS | DIASTOLIC BLOOD PRESSURE: 66 MMHG

## 2024-04-26 DIAGNOSIS — C44.702: ICD-10-CM

## 2024-04-26 RX ORDER — CEPHALEXIN 500 MG/1
500 CAPSULE ORAL 2 TIMES DAILY
Qty: 14 CAPSULE | Refills: 0 | Status: SHIPPED | OUTPATIENT
Start: 2024-04-26

## 2024-04-26 NOTE — PROGRESS NOTES
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  Patient had a previous biopsy 10/4/2023 confirming the presence of squamous cell carcinoma.  This was a rather extensive lesion in an area with very poor blood supply.  As result, patient was referred for definitive radiotherapy.  Patient was received a total of 5100 cGy completing treatment April 12, 2024.  Patient has continued to keep the area clean and dressed with nonstick pads.  Unfortunately, patient did not receive the Silvadene cream which was prescribed for him for this area.  He returns for interval skin check today.  Photograph from today as listed below:      Imaging:            Pathology:      Squamous cell carcinoma      Labs:    Lab Results   Component Value Date    CREATININE 1.4 (H) 04/19/2018               Problem List:  Patient Active Problem List   Diagnosis    Squamous cell skin cancer          Medications:  Current Outpatient Medications on File Prior to Visit   Medication Sig Dispense Refill    atorvastatin (LIPITOR) 10 MG tablet Take 2 tablets by mouth Daily.      silver sulfadiazine (SILVADENE, SSD) 1 % cream Apply 1 Application topically to the appropriate area as directed 2 (Two) Times a Day. Apply twice daily until a scab is formed over the open area 50 g 1     No current facility-administered medications on file prior to visit.          Allergies:  No Known Allergies        Vital Signs:  /66   Pulse 74   Wt 73.7 kg (162 lb 6.4 oz)   SpO2 97%   There is no height or weight on file to calculate BMI.  Pain Score    04/26/24 1001   PainSc: 0-No pain         ECOG: Ambulatory and capable of all selfcare but unable to carry out any work activities;  up and about more than 50% of waking hours = 2    Physical Exam  Constitutional:       Appearance: Normal appearance.   HENT:      Head: Atraumatic.   Eyes:      Extraocular Movements: Extraocular movements intact.      Pupils: Pupils are equal, round, and reactive to light.   Cardiovascular:      Rate and Rhythm: Normal rate and regular rhythm.   Pulmonary:      Effort: Pulmonary effort is normal.      Breath sounds: Normal breath sounds.   Musculoskeletal:         General: Normal range of motion.      Cervical back: Normal range of motion.   Skin:     Comments: Examination today demonstrates a mild amount of yellow serous drainage on the medial most portion of the wound.  There is also the slightest amount of lower extremity edema.  Because of these findings, patient will be treated with 1 week of Keflex 500 mg twice daily.   Neurological:      General: No focal deficit present.      Mental Status: He is alert.   Psychiatric:         Mood and Affect: Mood normal.         Behavior: Behavior normal.                   Diagnoses and all orders for this visit:    1. Primary cancer of skin of right lower leg        Assessment:    The lesion of the right leg continues to improve.  Patient has minimal signs of infection.  The lesion certainly is smaller than on previous examinations and healing islands are present within the midportion of the wound.  Very specific wound care instructions are given to Mr. Israel and his family today.    Plan:    Continued wound care.  Will begin Keflex 500 mg twice daily.  Patient will be doing daily cleansing and dressing with Silvadene cream until the area is healed at which time we will switch to Aquaphor ointment.       I spent 20 minutes caring for Matheus on this date of service. This time includes time spent by me in the following activities:obtaining and/or reviewing a separately obtained history, performing a medically appropriate examination and/or evaluation , counseling and  educating the patient/family/caregiver, ordering medications, tests, or procedures, documenting information in the medical record, and care coordination  Follow Up   Mr. Israel will be seen in 4 weeks for further follow-up evaluation.  Patient was given instructions and counseling regarding his condition or for health maintenance advice. Please see specific information pulled into the AVS if appropriate.     Odell Szymanski MD

## 2024-05-20 ENCOUNTER — TELEPHONE (OUTPATIENT)
Dept: RADIATION ONCOLOGY | Facility: HOSPITAL | Age: 89
End: 2024-05-20
Payer: MEDICARE

## 2024-05-20 DIAGNOSIS — C44.92 SQUAMOUS CELL SKIN CANCER: Primary | ICD-10-CM

## 2024-05-20 NOTE — TELEPHONE ENCOUNTER
Called PT to reschedule f/u with  on 6/3 due to Stephon being out of office and at [place]. We rescheduled his f/u visit for 6/6. Daughter also had some medical questions regarding medication that was prescribed. I messaged Dr. Szymanski's nurse(Daniella) regarding this.

## 2024-05-21 ENCOUNTER — LAB (OUTPATIENT)
Dept: LAB | Facility: HOSPITAL | Age: 89
End: 2024-05-21
Payer: MEDICARE

## 2024-05-21 DIAGNOSIS — C44.92 SQUAMOUS CELL SKIN CANCER: ICD-10-CM

## 2024-05-21 LAB — C DIFF TOX GENS STL QL NAA+PROBE: NEGATIVE

## 2024-05-21 PROCEDURE — 87493 C DIFF AMPLIFIED PROBE: CPT

## 2024-06-05 ENCOUNTER — TELEPHONE (OUTPATIENT)
Dept: RADIATION ONCOLOGY | Facility: HOSPITAL | Age: 89
End: 2024-06-05
Payer: MEDICARE

## 2024-06-06 ENCOUNTER — OFFICE VISIT (OUTPATIENT)
Dept: RADIATION ONCOLOGY | Facility: HOSPITAL | Age: 89
End: 2024-06-06
Payer: MEDICARE

## 2024-06-06 VITALS
WEIGHT: 163 LBS | OXYGEN SATURATION: 95 % | SYSTOLIC BLOOD PRESSURE: 125 MMHG | HEART RATE: 64 BPM | DIASTOLIC BLOOD PRESSURE: 66 MMHG

## 2024-06-06 DIAGNOSIS — C44.92 SQUAMOUS CELL SKIN CANCER: Primary | ICD-10-CM

## 2024-06-06 DIAGNOSIS — C44.702: ICD-10-CM

## 2024-06-06 PROCEDURE — G0463 HOSPITAL OUTPT CLINIC VISIT: HCPCS

## 2024-06-06 RX ORDER — FOLIC ACID 1 MG/1
1 TABLET ORAL DAILY
COMMUNITY

## 2024-06-06 RX ORDER — ATORVASTATIN CALCIUM 20 MG/1
20 TABLET, FILM COATED ORAL DAILY
COMMUNITY

## 2024-06-06 RX ORDER — METOPROLOL TARTRATE 50 MG/1
50 TABLET, FILM COATED ORAL 2 TIMES DAILY
COMMUNITY

## 2024-06-06 RX ORDER — UREA 10 %
500 LOTION (ML) TOPICAL DAILY
COMMUNITY

## 2024-06-06 NOTE — PROGRESS NOTES
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  Patient received a total of 51 Suárez completing treatment 4/12/2024.  This area was open and weeping a bit at the time of completion.  Patient's granddaughter is a nurse and has been taking excellent care of Mr. Israel doing dressing changes and monitoring the progress on on a weekly basis.      Imaging:            Pathology:      Well-differentiated squamous cell carcinoma      Labs:    Lab Results   Component Value Date    CREATININE 1.4 (H) 04/19/2018                 Problem List:  Patient Active Problem List   Diagnosis    Squamous cell skin cancer    Primary cancer of skin of right lower leg          Medications:  Current Outpatient Medications on File Prior to Visit   Medication Sig Dispense Refill    atorvastatin (LIPITOR) 20 MG tablet Take 1 tablet by mouth Daily.      cephalexin (KEFLEX) 500 MG capsule Take 1 capsule by mouth 2 (Two) Times a Day. 14 capsule 0    folic acid (FOLVITE) 1 MG tablet Take 1 tablet by mouth Daily.      metoprolol tartrate (LOPRESSOR) 50 MG tablet Take 1 tablet by mouth 2 (Two) Times a Day.      sertraline (ZOLOFT) 50 MG tablet Take 1 tablet by mouth Daily.      silver sulfadiazine (SILVADENE, SSD) 1 % cream Apply 1 Application topically to the appropriate area as directed 2 (Two) Times a Day. Apply twice daily until a scab is formed over the open area 50 g 1    vitamin B-12 (CYANOCOBALAMIN) 500 MCG tablet Take 1 tablet by mouth Daily.      [DISCONTINUED] atorvastatin (LIPITOR) 10 MG tablet Take 2 tablets by mouth Daily.       No current facility-administered medications  on file prior to visit.          Allergies:  No Known Allergies        Vital Signs:  /66   Pulse 64   Wt 73.9 kg (163 lb)   SpO2 95%   There is no height or weight on file to calculate BMI.  Pain Score    06/06/24 0916   PainSc: 0-No pain         ECOG: Restricted in physically strenuous activity but ambulatory and able to carry out work of a light or sedentary nature, e.g., light house work, office work = 1    Physical Exam   Physical examination today is limited to dorsal surface of the lower arms as well as the lower legs.  The area of previous treatment in the right pretibial skin has healed amazingly well.  There is no persistent nodularity and certainly no evidence of infection.  The area has completely reepithelialized.  Patient states that this area is nontender.  He continues to use moisturizing lotion on a regular basis.    There is a new lesion on the dorsal surface of the left hand as visualized above.  This certainly has all the air marks of a squamous cell carcinoma.  It is mildly tender.  I will send patient back to Dr. Charlie Richardson for reevaluation and excision.  If he has positive resection margins he may require some postoperative radiotherapy to this area as well.  He is comfortable with this approach.             Diagnoses and all orders for this visit:    1. Squamous cell skin cancer (Primary)    2. Primary cancer of skin of right lower leg        Assessment:    1.  Excellent healing from the skin lesion of the right pretibial skin.  There is no evidence of recurrent disease    2.  Presumed new squamous cell carcinoma dorsal surface of the left hand requiring excision and potentially postoperative radiotherapy as well    Plan:    Patient will be seen in follow-up evaluation here in 4 months.  Patient's granddaughter will be making appointment with Dr. Richardson for the next few weeks.       I spent 20 minutes caring for Matheus on this date of service. This time includes time spent by me  in the following activities:obtaining and/or reviewing a separately obtained history, performing a medically appropriate examination and/or evaluation , counseling and educating the patient/family/caregiver, ordering medications, tests, or procedures, referring and communicating with other health care professionals , documenting information in the medical record, independently interpreting results and communicating that information with the patient/family/caregiver, and care coordination  Follow Up   4 months  Patient was given instructions and counseling regarding his condition or for health maintenance advice. Please see specific information pulled into the AVS if appropriate.     Odell Szymanski MD

## 2024-10-09 ENCOUNTER — TELEPHONE (OUTPATIENT)
Dept: RADIATION ONCOLOGY | Facility: HOSPITAL | Age: 89
End: 2024-10-09
Payer: MEDICARE

## 2024-10-10 ENCOUNTER — OFFICE VISIT (OUTPATIENT)
Dept: RADIATION ONCOLOGY | Facility: HOSPITAL | Age: 89
End: 2024-10-10
Payer: MEDICARE

## 2024-10-10 VITALS
WEIGHT: 167.6 LBS | SYSTOLIC BLOOD PRESSURE: 145 MMHG | DIASTOLIC BLOOD PRESSURE: 79 MMHG | OXYGEN SATURATION: 96 % | HEART RATE: 70 BPM

## 2024-10-10 DIAGNOSIS — C44.92 SQUAMOUS CELL SKIN CANCER: Primary | ICD-10-CM

## 2024-10-10 PROCEDURE — G0463 HOSPITAL OUTPT CLINIC VISIT: HCPCS

## 2024-10-10 RX ORDER — FLUOROURACIL 50 MG/G
1 CREAM TOPICAL 2 TIMES DAILY
Qty: 40 G | Refills: 0 | Status: SHIPPED | OUTPATIENT
Start: 2024-10-10 | End: 2024-10-17

## 2024-10-10 NOTE — PROGRESS NOTES
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  Patient received a total of 51 Suárez completing treatment 4/12/2024.  This area was open and weeping a bit at the time of completion.  Matheus was seen in follow-up evaluation 6/6/2024 and at that time there is no evidence of residual disease.  There was noted however to be an additional squamous cell carcinoma the dorsal aspect of the left hand.  As result, patient was referred and underwent plastic surgical resection of this area as well as a separate area on the posterior aspect of the left lower leg.  These incisions are examined and found to be well-healed with no evidence of recurrent disease.  Patient does have some pink areas of concern in both the right and left temporal fossa and these are examined today as well.      Imaging:            Pathology:      Squamous cell carcinoma      Labs:    Lab Results   Component Value Date    CREATININE 1.4 (H) 04/19/2018                 Problem List:  Patient Active Problem List   Diagnosis    Squamous cell skin cancer    Primary cancer of skin of right lower leg          Medications:  Current Outpatient Medications on File Prior to Visit   Medication Sig Dispense Refill    atorvastatin (LIPITOR) 20 MG tablet Take 1 tablet by mouth Daily.      cephalexin (KEFLEX) 500 MG capsule Take 1 capsule by mouth 2 (Two) Times a Day. 14 capsule 0    folic acid (FOLVITE) 1 MG tablet Take 1 tablet by mouth Daily.      metoprolol tartrate (LOPRESSOR) 50 MG tablet Take 1 tablet by mouth 2 (Two) Times a Day.      sertraline (ZOLOFT) 50 MG tablet Take 1 tablet by mouth Daily.      silver sulfadiazine (SILVADENE, SSD) 1 % cream  Apply 1 Application topically to the appropriate area as directed 2 (Two) Times a Day. Apply twice daily until a scab is formed over the open area 50 g 1    vitamin B-12 (CYANOCOBALAMIN) 500 MCG tablet Take 1 tablet by mouth Daily.       No current facility-administered medications on file prior to visit.          Allergies:  No Known Allergies        Vital Signs:  /79   Pulse 70   Wt 76 kg (167 lb 9.6 oz)   SpO2 96%   There is no height or weight on file to calculate BMI.  Pain Score    10/10/24 1002   PainSc: 0-No pain         ECOG: Ambulatory and capable of all selfcare but unable to carry out any work activities; up and about more than 50% of waking hours = 2    Physical Exam  Constitutional:       Appearance: Normal appearance.   HENT:      Head: Normocephalic.   Eyes:      Extraocular Movements: Extraocular movements intact.      Pupils: Pupils are equal, round, and reactive to light.   Cardiovascular:      Rate and Rhythm: Normal rate and regular rhythm.   Pulmonary:      Effort: Pulmonary effort is normal.      Breath sounds: Normal breath sounds.   Musculoskeletal:      Cervical back: Normal range of motion.      Comments: Mr. Cody has limited mobility due to his age of 89 years.   Skin:     Comments: Skin is mostly dry.  No residual disease within the previously treated areas.  Patient's had a resection of the left posterior calf as well as the dorsal aspect of left hand both which are well-healed with no recurrent disease.  Patient does have erythematous patches and valving the right and left temporal fossa.  We will treat these with Efudex cream.    Skin of the back is carefully examined and no suspicious lesions are appreciated at this time   Neurological:      General: No focal deficit present.      Mental Status: He is alert and oriented to person, place, and time.   Psychiatric:         Mood and Affect: Mood normal.         Behavior: Behavior normal.         Thought Content: Thought content  normal.         Judgment: Judgment normal.                     There are no diagnoses linked to this encounter.    Assessment;  1.  Excellent response to previously treated skin abnormalities of the pretibial skin.  Patient has undergone plastic surgical resection of squamous cell carcinoma of the dorsal surface of the left hand since last seen.  In addition, patient has suspicious looking areas of the skin of the left and right temporal fossa.    Plan:    I will prescribe Efudex cream to be used twice a day for a week on both the left and right temporal fossa areas of suspected early malignancy.  Patient is aware that this can cause significant erythema in the region.  He will continue to use moisturizing cream over the pretibial skin as well as the face.       I spent 30 minutes caring for Matheus on this date of service. This time includes time spent by me in the following activities:reviewing tests, obtaining and/or reviewing a separately obtained history, performing a medically appropriate examination and/or evaluation , counseling and educating the patient/family/caregiver, ordering medications, tests, or procedures, referring and communicating with other health care professionals , and care coordination    Follow Up   3 months    Patient was given instructions and counseling regarding his condition or for health maintenance advice. Please see specific information pulled into the AVS if appropriate.     Odell Szymanski MD

## 2024-10-14 ENCOUNTER — TELEPHONE (OUTPATIENT)
Dept: RADIATION ONCOLOGY | Facility: HOSPITAL | Age: 89
End: 2024-10-14
Payer: MEDICARE

## 2024-10-14 NOTE — TELEPHONE ENCOUNTER
Patient is scheduled for 2mo F/U with Dr. Szymanski, this should actually be a 3mo F/U. LVM for pt to call back to move his appt on 12/12 out a month.

## 2024-12-11 ENCOUNTER — TELEPHONE (OUTPATIENT)
Dept: RADIATION ONCOLOGY | Facility: HOSPITAL | Age: 89
End: 2024-12-11
Payer: MEDICARE

## 2024-12-11 NOTE — TELEPHONE ENCOUNTER
Daughter returned call to reschedule PT's appt for 12/12 since it was scheduled too soon. Moved out one month

## 2025-01-14 ENCOUNTER — TELEPHONE (OUTPATIENT)
Dept: RADIATION ONCOLOGY | Facility: HOSPITAL | Age: OVER 89
End: 2025-01-14
Payer: MEDICARE

## 2025-01-14 NOTE — TELEPHONE ENCOUNTER
Spoke to daughter in law to reschedule PT and transfer or care over to  from . New appt is 1/24 with  for his follow up

## 2025-01-16 ENCOUNTER — TELEPHONE (OUTPATIENT)
Dept: RADIATION ONCOLOGY | Facility: HOSPITAL | Age: OVER 89
End: 2025-01-16
Payer: MEDICARE

## 2025-01-16 NOTE — TELEPHONE ENCOUNTER
Place a call to mr. Israel and spoke with Kayla daughter-in-law. At this Mr. Israel does not feel like his follow-up with our office is needed. They will call the office if they chage their mind fir a future appointment